# Patient Record
Sex: MALE | Race: WHITE | Employment: OTHER | URBAN - METROPOLITAN AREA
[De-identification: names, ages, dates, MRNs, and addresses within clinical notes are randomized per-mention and may not be internally consistent; named-entity substitution may affect disease eponyms.]

---

## 2017-01-01 ENCOUNTER — OFFICE VISIT (OUTPATIENT)
Dept: INTERNAL MEDICINE CLINIC | Age: 82
End: 2017-01-01

## 2017-01-01 ENCOUNTER — TELEPHONE (OUTPATIENT)
Dept: INTERNAL MEDICINE CLINIC | Age: 82
End: 2017-01-01

## 2017-01-01 VITALS
HEART RATE: 75 BPM | TEMPERATURE: 98.1 F | OXYGEN SATURATION: 97 % | DIASTOLIC BLOOD PRESSURE: 66 MMHG | BODY MASS INDEX: 24.17 KG/M2 | WEIGHT: 163.2 LBS | HEIGHT: 69 IN | SYSTOLIC BLOOD PRESSURE: 112 MMHG | RESPIRATION RATE: 20 BRPM

## 2017-01-01 VITALS
BODY MASS INDEX: 25 KG/M2 | TEMPERATURE: 98 F | SYSTOLIC BLOOD PRESSURE: 112 MMHG | DIASTOLIC BLOOD PRESSURE: 66 MMHG | RESPIRATION RATE: 20 BRPM | HEART RATE: 75 BPM | OXYGEN SATURATION: 98 % | HEIGHT: 69 IN | WEIGHT: 168.8 LBS

## 2017-01-01 VITALS
OXYGEN SATURATION: 93 % | HEART RATE: 74 BPM | RESPIRATION RATE: 20 BRPM | SYSTOLIC BLOOD PRESSURE: 144 MMHG | DIASTOLIC BLOOD PRESSURE: 79 MMHG | WEIGHT: 166 LBS | TEMPERATURE: 97.7 F | HEIGHT: 69 IN | BODY MASS INDEX: 24.59 KG/M2

## 2017-01-01 DIAGNOSIS — E11.9 TYPE 2 DIABETES MELLITUS WITHOUT COMPLICATION, WITH LONG-TERM CURRENT USE OF INSULIN (HCC): ICD-10-CM

## 2017-01-01 DIAGNOSIS — E03.9 ACQUIRED HYPOTHYROIDISM: ICD-10-CM

## 2017-01-01 DIAGNOSIS — K21.9 GASTROESOPHAGEAL REFLUX DISEASE WITHOUT ESOPHAGITIS: ICD-10-CM

## 2017-01-01 DIAGNOSIS — Z79.4 TYPE 2 DIABETES MELLITUS WITHOUT COMPLICATION, WITH LONG-TERM CURRENT USE OF INSULIN (HCC): Primary | ICD-10-CM

## 2017-01-01 DIAGNOSIS — L40.9 PSORIASIS: ICD-10-CM

## 2017-01-01 DIAGNOSIS — Z79.4 TYPE 2 DIABETES MELLITUS WITHOUT COMPLICATION, WITH LONG-TERM CURRENT USE OF INSULIN (HCC): ICD-10-CM

## 2017-01-01 DIAGNOSIS — E55.9 VITAMIN D DEFICIENCY: ICD-10-CM

## 2017-01-01 DIAGNOSIS — J45.909 MILD ASTHMA WITHOUT COMPLICATION, UNSPECIFIED WHETHER PERSISTENT: ICD-10-CM

## 2017-01-01 DIAGNOSIS — E78.00 PURE HYPERCHOLESTEROLEMIA: ICD-10-CM

## 2017-01-01 DIAGNOSIS — Z00.00 MEDICARE ANNUAL WELLNESS VISIT, INITIAL: ICD-10-CM

## 2017-01-01 DIAGNOSIS — I25.10 CORONARY ARTERY DISEASE INVOLVING NATIVE CORONARY ARTERY OF NATIVE HEART WITHOUT ANGINA PECTORIS: ICD-10-CM

## 2017-01-01 DIAGNOSIS — K21.9 GASTROESOPHAGEAL REFLUX DISEASE WITHOUT ESOPHAGITIS: Primary | ICD-10-CM

## 2017-01-01 DIAGNOSIS — E11.9 TYPE 2 DIABETES MELLITUS WITHOUT COMPLICATION, WITH LONG-TERM CURRENT USE OF INSULIN (HCC): Primary | ICD-10-CM

## 2017-01-01 DIAGNOSIS — E03.9 ACQUIRED HYPOTHYROIDISM: Primary | ICD-10-CM

## 2017-01-01 LAB
25(OH)D3+25(OH)D2 SERPL-MCNC: 30.2 NG/ML (ref 30–100)
25(OH)D3+25(OH)D2 SERPL-MCNC: 50.6 NG/ML (ref 30–100)
ALBUMIN SERPL-MCNC: 3.6 G/DL (ref 3.5–4.7)
ALBUMIN SERPL-MCNC: 3.9 G/DL (ref 3.5–4.7)
ALBUMIN/CREAT UR: 38.9 MG/G CREAT (ref 0–30)
ALBUMIN/GLOB SERPL: 1.2 {RATIO} (ref 1.2–2.2)
ALBUMIN/GLOB SERPL: 1.2 {RATIO} (ref 1.2–2.2)
ALP SERPL-CCNC: 131 IU/L (ref 39–117)
ALP SERPL-CCNC: 140 IU/L (ref 39–117)
ALT SERPL-CCNC: 12 IU/L (ref 0–44)
ALT SERPL-CCNC: 9 IU/L (ref 0–44)
AST SERPL-CCNC: 10 IU/L (ref 0–40)
AST SERPL-CCNC: 11 IU/L (ref 0–40)
BILIRUB SERPL-MCNC: 0.3 MG/DL (ref 0–1.2)
BILIRUB SERPL-MCNC: <0.2 MG/DL (ref 0–1.2)
BUN SERPL-MCNC: 10 MG/DL (ref 8–27)
BUN SERPL-MCNC: 14 MG/DL (ref 8–27)
BUN/CREAT SERPL: 12 (ref 10–24)
BUN/CREAT SERPL: 14 (ref 10–24)
CALCIUM SERPL-MCNC: 9.2 MG/DL (ref 8.6–10.2)
CALCIUM SERPL-MCNC: 9.5 MG/DL (ref 8.6–10.2)
CHLORIDE SERPL-SCNC: 96 MMOL/L (ref 96–106)
CHLORIDE SERPL-SCNC: 97 MMOL/L (ref 96–106)
CO2 SERPL-SCNC: 24 MMOL/L (ref 18–29)
CO2 SERPL-SCNC: 28 MMOL/L (ref 18–29)
CREAT SERPL-MCNC: 0.86 MG/DL (ref 0.76–1.27)
CREAT SERPL-MCNC: 0.98 MG/DL (ref 0.76–1.27)
CREAT UR-MCNC: 157.1 MG/DL
ERYTHROCYTE [DISTWIDTH] IN BLOOD BY AUTOMATED COUNT: 13.1 % (ref 12.3–15.4)
EST. AVERAGE GLUCOSE BLD GHB EST-MCNC: 292 MG/DL
EST. AVERAGE GLUCOSE BLD GHB EST-MCNC: 335 MG/DL
GFR SERPLBLD CREATININE-BSD FMLA CKD-EPI: 80 ML/MIN/1.73
GFR SERPLBLD CREATININE-BSD FMLA CKD-EPI: 93 ML/MIN/1.73
GLOBULIN SER CALC-MCNC: 3 G/DL (ref 1.5–4.5)
GLOBULIN SER CALC-MCNC: 3.2 G/DL (ref 1.5–4.5)
GLUCOSE SERPL-MCNC: 284 MG/DL (ref 65–99)
GLUCOSE SERPL-MCNC: 294 MG/DL (ref 65–99)
HBA1C MFR BLD: 11.8 % (ref 4.8–5.6)
HBA1C MFR BLD: 13.3 % (ref 4.8–5.6)
HCT VFR BLD AUTO: 45.1 % (ref 37.5–51)
HGB BLD-MCNC: 15.7 G/DL (ref 12.6–17.7)
Lab: NORMAL
MCH RBC QN AUTO: 30.4 PG (ref 26.6–33)
MCHC RBC AUTO-ENTMCNC: 34.8 G/DL (ref 31.5–35.7)
MCV RBC AUTO: 87 FL (ref 79–97)
MICROALBUMIN UR-MCNC: 61.1 UG/ML
PLATELET # BLD AUTO: 300 X10E3/UL (ref 150–379)
POTASSIUM SERPL-SCNC: 5 MMOL/L (ref 3.5–5.2)
POTASSIUM SERPL-SCNC: 5.2 MMOL/L (ref 3.5–5.2)
PROT SERPL-MCNC: 6.6 G/DL (ref 6–8.5)
PROT SERPL-MCNC: 7.1 G/DL (ref 6–8.5)
RBC # BLD AUTO: 5.17 X10E6/UL (ref 4.14–5.8)
SODIUM SERPL-SCNC: 137 MMOL/L (ref 134–144)
SODIUM SERPL-SCNC: 139 MMOL/L (ref 134–144)
TSH SERPL DL<=0.005 MIU/L-ACNC: 0.05 UIU/ML (ref 0.45–4.5)
TSH SERPL DL<=0.005 MIU/L-ACNC: 4.88 UIU/ML (ref 0.45–4.5)
WBC # BLD AUTO: 8.7 X10E3/UL (ref 3.4–10.8)

## 2017-01-01 RX ORDER — LANCETS
EACH MISCELLANEOUS
Qty: 100 EACH | Refills: 12 | Status: SHIPPED | OUTPATIENT
Start: 2017-01-01

## 2017-01-01 RX ORDER — OMEPRAZOLE 20 MG/1
CAPSULE, DELAYED RELEASE ORAL
Qty: 60 CAP | Refills: 5 | Status: SHIPPED | OUTPATIENT
Start: 2017-01-01

## 2017-01-01 RX ORDER — LEVOTHYROXINE SODIUM 150 UG/1
150 TABLET ORAL
Qty: 30 TAB | Refills: 3 | Status: SHIPPED | OUTPATIENT
Start: 2017-01-01

## 2017-01-01 RX ORDER — INSULIN PUMP SYRINGE, 3 ML
EACH MISCELLANEOUS
Qty: 1 KIT | Refills: 0 | Status: SHIPPED | OUTPATIENT
Start: 2017-01-01

## 2017-01-01 RX ORDER — LEVOTHYROXINE SODIUM 175 UG/1
175 TABLET ORAL
Qty: 30 TAB | Refills: 5 | Status: SHIPPED | OUTPATIENT
Start: 2017-01-01 | End: 2017-01-01 | Stop reason: DRUGHIGH

## 2017-01-01 RX ORDER — METFORMIN HYDROCHLORIDE 750 MG/1
TABLET, EXTENDED RELEASE ORAL
Qty: 90 TAB | Refills: 5 | Status: SHIPPED | OUTPATIENT
Start: 2017-01-01

## 2017-01-01 RX ORDER — METFORMIN HYDROCHLORIDE 750 MG/1
1500 TABLET, EXTENDED RELEASE ORAL DAILY
Qty: 60 TAB | Refills: 5 | Status: SHIPPED | OUTPATIENT
Start: 2017-01-01 | End: 2017-01-01 | Stop reason: SDUPTHER

## 2017-01-01 RX ORDER — OMEPRAZOLE 20 MG/1
40 CAPSULE, DELAYED RELEASE ORAL DAILY
Qty: 60 CAP | Refills: 5 | Status: SHIPPED | OUTPATIENT
Start: 2017-01-01 | End: 2017-01-01 | Stop reason: SDUPTHER

## 2017-01-01 RX ORDER — ATORVASTATIN CALCIUM 20 MG/1
TABLET, FILM COATED ORAL
Refills: 4 | COMMUNITY
Start: 2017-01-01

## 2017-01-01 RX ORDER — LEVOTHYROXINE SODIUM 150 UG/1
150 TABLET ORAL
Qty: 30 TAB | Refills: 3 | Status: SHIPPED | OUTPATIENT
Start: 2017-01-01 | End: 2017-01-01 | Stop reason: DRUGHIGH

## 2017-01-01 RX ORDER — ALBUTEROL SULFATE 90 UG/1
1 AEROSOL, METERED RESPIRATORY (INHALATION)
Qty: 1 INHALER | Refills: 1 | Status: SHIPPED | OUTPATIENT
Start: 2017-01-01

## 2017-02-28 NOTE — PROGRESS NOTES
Health Maintenance Due   Topic Date Due    FOOT EXAM Q1  09/09/1944    EYE EXAM RETINAL OR DILATED Q1  09/09/1944    DTaP/Tdap/Td series (1 - Tdap) 09/09/1955    ZOSTER VACCINE AGE 60>  09/09/1994    GLAUCOMA SCREENING Q2Y  09/09/1999    Pneumococcal 65+ Low/Medium Risk (1 of 2 - PCV13) 09/09/1999    MEDICARE YEARLY EXAM  09/09/1999    INFLUENZA AGE 9 TO ADULT  08/01/2016       Chief Complaint   Patient presents with    Follow-up     4 month    Coronary Artery Disease    Cholesterol Problem    Diabetes    Hypothyroidism       1. Have you been to the ER, urgent care clinic since your last visit? Hospitalized since your last visit? No    2. Have you seen or consulted any other health care providers outside of the [address] [place] [address] since your last visit? Include any pap smears or colon screening. No    3) Do you have an Advance Directive on file? no    4) Are you interested in receiving information on Advance Directives? NO      Patient is accompanied by self I have received verbal consent from Yuliya Breaux to discuss any/all medical information while they are present in the room.

## 2017-02-28 NOTE — PATIENT INSTRUCTIONS

## 2017-02-28 NOTE — MR AVS SNAPSHOT
Visit Information Date & Time Provider Department Dept. Phone Encounter #  
 2/28/2017  8:30 AM Nori Kim MD [facility] Internal Medicine [facility] [phone] [id number] Upcoming Health Maintenance Date Due  
 FOOT EXAM Q1 9/9/1944 EYE EXAM RETINAL OR DILATED Q1 9/9/1944 DTaP/Tdap/Td series (1 - Tdap) 9/9/1955 ZOSTER VACCINE AGE 60> 9/9/1994 GLAUCOMA SCREENING Q2Y 9/9/1999 Pneumococcal 65+ Low/Medium Risk (1 of 2 - PCV13) 9/9/1999 MEDICARE YEARLY EXAM 9/9/1999 INFLUENZA AGE 9 TO ADULT 8/1/2016 HEMOGLOBIN A1C Q6M 4/25/2017 MICROALBUMIN Q1 7/27/2017 LIPID PANEL Q1 7/27/2017 Allergies as of 2/28/2017  Review Complete On: 2/28/2017 By: Nori Kim MD  
  
 Severity Noted Reaction Type Reactions Shellfish Derived High 07/26/2016    Anaphylaxis Iodine Medium 07/26/2016    Rash Vancomycin  07/26/2016    Other (comments) States had extreme temp change Current Immunizations  Never Reviewed No immunizations on file. Not reviewed this visit You Were Diagnosed With   
  
 Codes Comments Type 2 diabetes mellitus without complication, with long-term current use of insulin (HCC)    -  Primary ICD-10-CM: E11.9, Z79.4 ICD-9-CM: 250.00, V58.67 Pure hypercholesterolemia     ICD-10-CM: E78.00 ICD-9-CM: 272.0 Acquired hypothyroidism     ICD-10-CM: E03.9 ICD-9-CM: 244.9 Psoriasis     ICD-10-CM: L40.9 ICD-9-CM: 696.1 Vitamin D deficiency     ICD-10-CM: E55.9 ICD-9-CM: 268.9 Vitals BP  
  
  
  
  
  
 112/66 (BP 1 Location: Right arm, BP Patient Position: Sitting) Vitals History BMI and BSA Data Body Mass Index Body Surface Area  
 24.1 kg/m 2 1.9 m 2 Preferred Pharmacy Pharmacy Name [facility] [phone] Your Updated Medication List  
  
   
This list is accurate as of: 2/28/17  9:12 AM.  Always use your most recent med list.  
  
  
  
  
 adalimumab 40 mg/0.8 mL Pnkt  
by SubCUTAneous route. Indications: inject 1 every 15 days  
  
 aspirin delayed-release 81 mg tablet Take  by mouth daily. atorvastatin 20 mg tablet Commonly known as:  LIPITOR Blood-Glucose Meter monitoring kit True metrix meter Provider, please review the patient's drug allergy history. Some issues need clarification. BS TID Cholecalciferol (Vitamin D3) 2,000 unit Cap capsule Commonly known as:  VITAMIN D3 Take 2,000 Units by mouth daily. dorzolamide 2 % ophthalmic solution Commonly known as:  TRUSOPT Administer 1 Drop to both eyes two (2) times a day. glucose blood VI test strips strip Commonly known as:  ASCENSIA AUTODISC VI, ONE TOUCH ULTRA TEST VI  
True metrix strips. check BS TID  
  
 insulin glargine 300 unit/mL (1.5 mL) Inpn 30 Units by SubCUTAneous route every morning. Lancets Misc Check BS TID  
  
 latanoprost 0.005 % ophthalmic solution Commonly known as:  Joyceann Shock Administer 1 Drop to both eyes nightly. levothyroxine 125 mcg tablet Commonly known as:  SYNTHROID Take 1 Tab by mouth Daily (before breakfast). metFORMIN  mg tablet Commonly known as:  GLUCOPHAGE XR Take 1 Tab by mouth daily. ONE TOUCH TEST STRIP YOUSSEF  
by Does Not Apply route. Prescriptions Sent to Pharmacy Refills Blood-Glucose Meter monitoring kit 0 Sig: True metrix meter Provider, please review the patient's drug allergy history. Some issues need clarification. BS TID Class: Normal  
 Pharmacy: [address] Ph #: [phone]  
 glucose blood VI test strips (ASCENSIA AUTODISC VI, ONE TOUCH ULTRA TEST VI) strip 12 Sig: True metrix strips. check BS TID Class: Normal  
 Pharmacy: [address] Ph #: [phone] Lancets misc 12 Sig: Check BS TID Class: Normal  
 Pharmacy: [address]  Ph #: [phone] We Performed the Following CBC WITH AUTOMATED DIFF [14983 CPT(R)] HEMOGLOBIN A1C WITH EAG [37042 CPT(R)] METABOLIC PANEL, COMPREHENSIVE [50177 CPT(R)] TSH 3RD GENERATION [40330 CPT(R)] VITAMIN D, 25 HYDROXY [id number] CPT(R)] Patient Instructions Learning About Diabetes Food Guidelines Your Care Instructions Meal planning is important to manage diabetes. It helps keep your blood sugar at a target level (which you set with your doctor). You don't have to eat special foods. You can eat what your family eats, including sweets once in a while. But you do have to pay attention to how often you eat and how much you eat of certain foods. You may want to work with a dietitian or a certified diabetes educator (CDE) to help you plan meals and snacks. A dietitian or CDE can also help you lose weight if that is one of your goals. What should you know about eating carbs? Managing the amount of carbohydrate (carbs) you eat is an important part of healthy meals when you have diabetes. Carbohydrate is found in many foods. · Learn which foods have carbs. And learn the amounts of carbs in different foods. ¨ Bread, cereal, pasta, and rice have about 15 grams of carbs in a serving. A serving is 1 slice of bread (1 ounce), ½ cup of cooked cereal, or 1/3 cup of cooked pasta or rice. ¨ Fruits have 15 grams of carbs in a serving. A serving is 1 small fresh fruit, such as an apple or orange; ½ of a banana; ½ cup of cooked or canned fruit; ½ cup of fruit juice; 1 cup of melon or raspberries; or 2 tablespoons of dried fruit. ¨ Milk and no-sugar-added yogurt have 15 grams of carbs in a serving. A serving is 1 cup of milk or 2/3 cup of no-sugar-added yogurt. ¨ Starchy vegetables have 15 grams of carbs in a serving.  A serving is ½ cup of mashed potatoes or sweet potato; 1 cup winter squash; ½ of a small baked potato; ½ cup of cooked beans; or ½ cup cooked corn or green peas. · Learn how much carbs to eat each day and at each meal. A dietitian or CDE can teach you how to keep track of the amount of carbs you eat. This is called carbohydrate counting. · If you are not sure how to count carbohydrate grams, use the Plate Method to plan meals. It is a good, quick way to make sure that you have a balanced meal. It also helps you spread carbs throughout the day. ¨ Divide your plate by types of foods. Put non-starchy vegetables on half the plate, meat or other protein food on one-quarter of the plate, and a grain or starchy vegetable in the final quarter of the plate. To this you can add a small piece of fruit and 1 cup of milk or yogurt, depending on how many carbs you are supposed to eat at a meal. 
· Try to eat about the same amount of carbs at each meal. Do not \"save up\" your daily allowance of carbs to eat at one meal. 
· Proteins have very little or no carbs per serving. Examples of proteins are beef, chicken, turkey, fish, eggs, tofu, cheese, cottage cheese, and peanut butter. A serving size of meat is 3 ounces, which is about the size of a deck of cards. Examples of meat substitute serving sizes (equal to 1 ounce of meat) are 1/4 cup of cottage cheese, 1 egg, 1 tablespoon of peanut butter, and ½ cup of tofu. How can you eat out and still eat healthy? · Learn to estimate the serving sizes of foods that have carbohydrate. If you measure food at home, it will be easier to estimate the amount in a serving of restaurant food. · If the meal you order has too much carbohydrate (such as potatoes, corn, or baked beans), ask to have a low-carbohydrate food instead. Ask for a salad or green vegetables. · If you use insulin, check your blood sugar before and after eating out to help you plan how much to eat in the future. · If you eat more carbohydrate at a meal than you had planned, take a walk or do other exercise. This will help lower your blood sugar. What else should you know? · Limit saturated fat, such as the fat from meat and dairy products. This is a healthy choice because people who have diabetes are at higher risk of heart disease. So choose lean cuts of meat and nonfat or low-fat dairy products. Use olive or canola oil instead of butter or shortening when cooking. · Don't skip meals. Your blood sugar may drop too low if you skip meals and take insulin or certain medicines for diabetes. · Check with your doctor before you drink alcohol. Alcohol can cause your blood sugar to drop too low. Alcohol can also cause a bad reaction if you take certain diabetes medicines. Follow-up care is a key part of your treatment and safety. Be sure to make and go to all appointments, and call your doctor if you are having problems. It's also a good idea to know your test results and keep a list of the medicines you take. Where can you learn more? Go to http://jazz-roger.info/. Enter E051 in the search box to learn more about \"Learning About Diabetes Food Guidelines. \" Current as of: May 23, 2016 Content Version: 11.1 © 4287-2503 [employer]. Care instructions adapted under license by [employer] (which disclaims liability or warranty for this information). If you have questions about a medical condition or this instruction, always ask your healthcare professional. [place] [address] any warranty or liability for your use of this information. Introducing [facility]! Evelyn Major introduces [employer] patient portal. Now you can access parts of your medical record, email your doctor's office, and request medication refills online. 1. In your internet browser, go to [email or web address] 2. Click on the First Time User? Click Here link in the Sign In box. You will see the New Member Sign Up page. 3. Enter your Ornis Access Code exactly as it appears below. You will not need to use this code after youve completed the sign-up process. If you do not sign up before the expiration date, you must request a new code. · Ornis Access Code: [email or web address] Expires: 5/29/2017  9:12 AM 
 
4. Enter the last four digits of your Social Security Number (xxxx) and Date of Birth (mm/dd/yyyy) as indicated and click Submit. You will be taken to the next sign-up page. 5. Create a Ornis ID. This will be your Ornis login ID and cannot be changed, so think of one that is secure and easy to remember. 6. Create a Ornis password. You can change your password at any time. 7. Enter your Password Reset Question and Answer. This can be used at a later time if you forget your password. 8. Enter your e-mail address. You will receive e-mail notification when new information is available in [address] 9. Click Sign Up. You can now view and download portions of your medical record. 10. Click the Download Summary menu link to download a portable copy of your medical information. If you have questions, please visit the Frequently Asked Questions section of the Ornis website. Remember, Ornis is NOT to be used for urgent needs. For medical emergencies, dial 911. Now available from your iPhone and Android! Please provide this summary of care documentation to your next provider. Your primary care clinician is listed as Mary Jo Hdz. If you have any questions after today's visit, please call [phone].

## 2017-02-28 NOTE — PROGRESS NOTES
HISTORY OF PRESENT ILLNESS  Trent Mackay is a 80 y.o. male here for follow up.he mention [facility] came to his room and had lab work this month. he brought lab result. He is seen for diabetes. He reports medication compliance: compliant all of the time. Diabetic diet compliance: noncompliant much of the time. Home glucose monitoring: is performed sporadically, Patient did not bring glucose log to this visit. . Further diabetic ROS: no polyuria or polydipsia, no chest pain, dyspnea or TIA's, no numbness, tingling or pain in extremities, no hypoglycemia. Lab review: labs reviewed, I note that glycosylated hemoglobin normal, abnormal 12., labs reviewed and discussed with patient. Eye exam: up to date     Ha slow thyroid. on med. labs are done ,thyroid is low. He has dry flaky skin. has psoriasis. he mention he did nto have humara shot for past 2 months. He will restart again. HPI    Review of Systems   Constitutional: Negative. HENT: Negative. Eyes: Negative. Cardiovascular: Negative. Gastrointestinal: Negative. Genitourinary: Negative. Musculoskeletal: Negative. Skin: Positive for rash. Neurological: Negative. Psychiatric/Behavioral: Negative. Physical Exam   Constitutional: He appears well-developed and well-nourished. No distress. Neck: Normal range of motion. Neck supple. No JVD present. No thyromegaly present. Cardiovascular: Normal rate, regular rhythm, normal heart sounds and intact distal pulses. Pulmonary/Chest: Effort normal and breath sounds normal. No respiratory distress. He has no wheezes. Skin:   Dry flaky skin   Psychiatric: He has a normal mood and affect. His behavior is normal.       ASSESSMENT and PLAN  Timi Resendiz was seen today for follow-up, coronary artery disease, cholesterol problem, diabetes and hypothyroidism.     Diagnoses and all orders for this visit:    Type 2 diabetes mellitus without complication, with long-term current use of insulin ([facility]    Will order,  -     Blood-Glucose Meter monitoring kit; True metrix meter Provider, please review the patient's drug allergy history. Some issues need clarification. BS TID  -     glucose blood VI test strips (ASCENSIA AUTODISC VI, ONE TOUCH ULTRA TEST VI) strip; True metrix strips. check BS TID  -     Lancets misc; Check BS TID  Pt already had recent lab work done by [facility]. A1c is 12,slightly improved. -     Cancel: HEMOGLOBIN A1C WITH EAG  -     Cancel: METABOLIC PANEL, COMPREHENSIVE  -     METABOLIC PANEL, BASIC    Will increase,  -     insulin glargine 300 unit/mL (1.5 mL) inpn; 34 Units by SubCUTAneous route every morning. will increase metformin XR to 1500 mg every day. will check CMP in 1month. Pure hypercholesterolemia    done labs. stable. -METABOLIC PANEL, COMPREHENSIVE    Acquired hypothyroidism    TSh is high. Will increase, syntroid. -     TSH 3RD GENERATION  -     levothyroxine (SYNTHROID) 150 mcg tablet; Take 1 Tab by mouth Daily (before breakfast). -     TSH 3RD GENERATION; Future in 6 weeks. Psoriasis    On humara injection.  -     Cancel: CBC WITH AUTOMATED DIFF    Vitamin D deficiency   VITAMIN D, 25 HYDROXY    Uncontrolled type 2 diabetes mellitus without complication, with long-term current use of insulin (HCC)  -     metFORMIN ER (GLUCOPHAGE XR) 750 mg tablet; Take 2 Tabs by mouth daily. -     VITAMIN D, 25 HYDROXY  -     insulin glargine 300 unit/mL (1.5 mL) inpn; 34 Units by SubCUTAneous route every morning. Issues reviewed with her: referral to Diabetic Education department, diabetic diet discussed in detail, written exchange diet given, home glucose monitoring emphasized, all medications, side effects and compliance discussed carefully, foot care discussed and Podiatry visits discussed, annual eye examinations at Ophthalmology discussed, long term diabetic complications discussed and labs immediately prior to next visit.

## 2017-06-27 NOTE — PATIENT INSTRUCTIONS
Schedule of Personalized Health Plan  (Provide Copy to Patient)  The best way to stay healthy is to live a healthy lifestyle. A healthy lifestyle includes regular exercise, eating a well-balanced diet, keeping a healthy weight and not smoking. Regular physical exams and screening tests are another important way to take care of yourself. Preventive exams provided by health care providers can find health problems early when treatment works best and can keep you from getting certain diseases or illnesses. Preventive services include exams, lab tests, screenings, shots, monitoring and information to help you take care of your own health. All people over 65 should have a pneumonia shot. Pneumonia shots are usually only needed once in a lifetime unless your doctor decides differently. Up to date    All people over 72 should have a yearly flu shot. People over 65 are at medium to high risk for Hepatitis B. Three shots are needed for complete protection. In addition to your physical exam, some screening tests are recommended:    Bone mass measurement (dexa scan) is recommended every two years if you have certain risk factors, such as personal history of vertebral fracture or chronic steroid medication use    Diabetes Mellitus screening is recommended every year. Glaucoma is an eye disease caused by high pressure in the eye. An eye exam is recommended every year. Up to date    Cardiovascular screening tests that check your cholesterol and other blood fat (lipid) levels are recommended every five years. Up to date    Colorectal Cancer screening tests help to find pre-cancerous polyps (growths in the colon) so they can be removed before they turn into cancer. Tests ordered for screening depend on your personal and family history risk factors. N/A    Screening for Prostate Cancer is recommended yearly with a digital rectal exam and/or a PSA test.N/A    Here is a list of your current Health Maintenance items with a due date:  Health Maintenance   Topic Date Due    EYE EXAM RETINAL OR DILATED Q1  09[birth year]09/1944    DTaP/Tdap/Td series (1 - Tdap) 09/09/1955    ZOSTER VACCINE AGE 60>  09/09/1994    Pneumococcal 65+ Low/Medium Risk (1 of 2 - PCV13) 09/09/1999    HEMOGLOBIN A1C Q6M  04/25/2017    MICROALBUMIN Q1  07/27/2017    INFLUENZA AGE 9 TO ADULT  08/01/2017    LIPID PANEL Q1  02/18/2018    FOOT EXAM Q1  06/27/2018    MEDICARE YEARLY EXAM  06/28/2018    GLAUCOMA SCREENING Q2Y  06/02/2019

## 2017-06-27 NOTE — PROGRESS NOTES
HISTORY OF PRESENT ILLNESS  Spike Edwards is a 80 y.o. male here for follow up. Has CAD,seeing cardiologist.will have cath again. He is seen for diabetes. He reports medication compliance: compliant all of the time. Diabetic diet compliance: noncompliant much of the time. Home glucose monitoring: is performed sporadically, Patient did not bring glucose log to this visit. . Further diabetic ROS: no polyuria or polydipsia, no chest pain, dyspnea or TIA's, no numbness, tingling or pain in extremities, no hypoglycemia. Lab review: labs reviewed, I note that glycosylated hemoglobin normal, abnormal 12., labs reviewed and discussed with patient. Eye exam: up to date     Ha slow thyroid. on med. labs are done ,thyroid is low. He has dry flaky skin. has psoriasis. he mention he did not have humara shot for past 5 months. Here for medicare wellness visit. Cholesterol Problem     Diabetes         Review of Systems   Constitutional: Negative. HENT: Negative. Eyes: Negative. Cardiovascular: Negative. Gastrointestinal: Negative. Genitourinary: Negative. Musculoskeletal: Negative. Skin: Negative. Neurological: Negative. Psychiatric/Behavioral: Negative. Physical Exam   Constitutional: He appears well-developed and well-nourished. No distress. Neck: Normal range of motion. Neck supple. No JVD present. No thyromegaly present. Cardiovascular: Normal rate, regular rhythm, normal heart sounds and intact distal pulses. Pulmonary/Chest: Effort normal and breath sounds normal. No respiratory distress. He has no wheezes. Skin:   Dry flaky skin   Psychiatric: He has a normal mood and affect. His behavior is normal.       ASSESSMENT and PLAN  Jono Monge was seen today for coronary artery disease, hypothyroidism, cholesterol problem, diabetes and annual wellness visit.     Diagnoses and all orders for this visit:    Type 2 diabetes mellitus without complication, with long-term current use of insulin ([address]  On metformin. Will do,    -     METABOLIC PANEL, COMPREHENSIVE  -     HEMOGLOBIN A1C WITH EAG  -     MICROALBUMIN, UR, RAND W/ MICROALBUMIN/CREA RATIO    Coronary artery disease involving native coronary artery of native heart without angina pectoris    Stable. will see cardiologist soon for recath.stent place 8 yrs back. -     METABOLIC PANEL, COMPREHENSIVE  -     TSH 3RD GENERATION  -     CBC W/O DIFF    Pure hypercholesterolemia    On statin. -     METABOLIC PANEL, COMPREHENSIVE    Acquired hypothyroidism    On synthroid. -     TSH 3RD GENERATION    Psoriasis  Off injection  Medicare annual wellness visit, initial    Vitamin D deficiency  -     VITAMIN D, 25 HYDROXY    :      Issues reviewed with her: referral to Diabetic Education department, diabetic diet discussed in detail, written exchange diet given, home glucose monitoring emphasized, all medications, side effects and compliance discussed carefully, foot care discussed and Podiatry visits discussed, annual eye examinations at Ophthalmology discussed, long term diabetic complications discussed and labs immediately prior to next visit.

## 2017-06-27 NOTE — PROGRESS NOTES
Health Maintenance Due   Topic Date Due    FOOT EXAM Q1  09[birth year]09/1944    EYE EXAM RETINAL OR DILATED Q1  09/09/1944    DTaP/Tdap/Td series (1 - Tdap) 09/09/1955    ZOSTER VACCINE AGE 60>  09/09/1994    GLAUCOMA SCREENING Q2Y  09/09/1999    Pneumococcal 65+ Low/Medium Risk (1 of 2 - PCV13) 09/09/1999    MEDICARE YEARLY EXAM  09/09/1999    HEMOGLOBIN A1C Q6M  04/25/2017    MICROALBUMIN Q1  07/27/2017       Chief Complaint   Patient presents with    Coronary Artery Disease     4 month follow up    Hypothyroidism    Cholesterol Problem    Diabetes       1. Have you been to the ER, urgent care clinic since your last visit? Hospitalized since your last visit? No    2. Have you seen or consulted any other health care providers outside of the [address] [place] [address] since your last visit? Include any pap smears or colon screening. No    3) Do you have an Advance Directive on file? no    4) Are you interested in receiving information on Advance Directives? NO      Patient is accompanied by self I have received verbal consent from Maribel Lopez to discuss any/all medical information while they are present in the room.

## 2017-06-27 NOTE — PROGRESS NOTES
Rogenia Fabry is a 80 y.o. male and presents for annual Medicare Wellness Visit. Problem List: Reviewed with patient and discussed risk factors. Patient Active Problem List   Diagnosis Code    Coronary artery disease involving native coronary artery of native heart without angina pectoris I25.10    Pure hypercholesterolemia E78.00    Type 2 diabetes mellitus without complication (HCC) G31.2    Acquired hypothyroidism E03.9    Psoriasis L40.9    Schatzki's ring K22.2    Advance care planning Z71.89    Uncontrolled type 2 diabetes mellitus without complication, with long-term current use of insulin ([facility] E11.65, Z79.4       Current medical providers:  Patient Care Team:  Roberts Spatz, MD as PCP - General (Internal Medicine)  Janet Duke LPN    PSH: Reviewed with patient  Past Surgical History:   Procedure Laterality Date    CARDIAC SURG PROCEDURE UNLIST      coronary angioplasty,and stent placement    HX CIRCUMCISION      HX HEENT      nasal polyp removal    NEUROLOGICAL PROCEDURE UNLISTED      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 60 Cap 5    atorvastatin (LIPITOR) 20 mg tablet   4    Blood-Glucose Meter monitoring kit True metrix meter Provider, please review the patient's drug allergy history. Some issues need clarification.  BS TID 1 Kit 0    glucose blood VI test strips (ASCENSIA AUTODISC VI, ONE TOUCH ULTRA TEST VI) strip True metrix strips. check BS  Strip 12    Lancets misc Check BS  Each 12    metFORMIN ER (GLUCOPHAGE XR) 750 mg tablet Take 2 Tabs by mouth daily. 60 Tab 5    levothyroxine (SYNTHROID) 150 mcg tablet Take 1 Tab by mouth Daily (before breakfast). 30 Tab 3    insulin glargine 300 unit/mL (1.5 mL) inpn 34 Units by SubCUTAneous route every morning. 2 Adjustable Dose Pre-filled Pen Syringe 5    Cholecalciferol, Vitamin D3, (VITAMIN D3) 2,000 unit cap capsule Take 2,000 Units by mouth daily. 30 Cap 4    dorzolamide (TRUSOPT) 2 % ophthalmic solution Administer 1 Drop to both eyes two (2) times a day.  latanoprost (XALATAN) 0.005 % ophthalmic solution Administer 1 Drop to both eyes nightly.  DIABETIC SUPPLIES,MISCELL (ONE TOUCH TEST STRIP YOUSSEF) by Does Not Apply route.  aspirin delayed-release 81 mg tablet Take  by mouth daily.  adalimumab 40 mg/0.8 mL pnkt by SubCUTAneous route. Indications: inject 1 every 15 days       No current facility-administered medications on file prior to visit. Allergies   Allergen Reactions    Shellfish Derived Anaphylaxis    Iodine Rash    Vancomycin Other (comments)     States had extreme temp change         Objective:  Visit Vitals    /66 (BP 1 Location: Right arm, BP Patient Position: Sitting)    Pulse 75    Temp 98 °F (36.7 °C) (Oral)    Resp 20    Ht 5' 9\" (1.753 m)    Wt 168 lb 12.8 oz (76.6 kg)    SpO2 98%    BMI 24.93 kg/m2    Body mass index is 24.93 kg/(m^2). Assessment of cognitive impairment: Alert and oriented x 3    Depression Screen:   PHQ over the last two weeks 6/27/2017   Little interest or pleasure in doing things Not at all   Feeling down, depressed or hopeless Not at all   Total Score PHQ 2 0       Fall Risk Assessment:    Fall Risk Assessment, last 12 mths 6/27/2017   Able to walk? Yes   Fall in past 12 months? No       Functional Ability:   Does the patient exhibit a steady gait?   yes   How long did it take the patient to get up and walk from a sitting position? 45 sec   Is the patient self reliant?  (ie can do own laundry, meals, household chores)  yes     Does the patient handle his/her own medications? yes     Does the patient handle his/her own money? yes     Is the patients home safe (ie good lighting, handrails on stairs and bath, etc.)? yes     Did you notice or did patient express any hearing difficulties? no     Did you notice or did patient express any vision difficulties?   no     Were distance and reading eye charts used? no       Advance Care Planning:   Patient was offered the opportunity to discuss advance care planning:  no     Does patient have an Advance Directive:  yes   If no, did you provide information on Caring Connections?  no       Plan:      Orders Placed This Encounter    METABOLIC PANEL, COMPREHENSIVE    HEMOGLOBIN A1C WITH EAG    TSH 3RD GENERATION    CBC W/O DIFF    VITAMIN D, 25 HYDROXY    MICROALBUMIN, UR, RAND W/ MICROALBUMIN/CREA RATIO       Health Maintenance   Topic Date Due    EYE EXAM RETINAL OR DILATED Q1  09[birth year]09/1944    DTaP/Tdap/Td series (1 - Tdap) 09/09/1955    ZOSTER VACCINE AGE 60>  09/09/1994    Pneumococcal 65+ Low/Medium Risk (1 of 2 - PCV13) 09/09/1999    HEMOGLOBIN A1C Q6M  04/25/2017    MICROALBUMIN Q1  07/27/2017    INFLUENZA AGE 9 TO ADULT  08/01/2017    LIPID PANEL Q1  02/18/2018    FOOT EXAM Q1  06/27/2018    MEDICARE YEARLY EXAM  06/28/2018    GLAUCOMA SCREENING Q2Y  06/02/2019       *Patient verbalized understanding and agreement with the plan. A copy of the After Visit Summary with personalized health plan was given to the patient today.

## 2017-06-27 NOTE — LETTER
6/29/2017 3:23 PM 
 
Mr. Rosa [id number] [phone] Dear Fransisca Kc: Please find your most recent results below. Resulted Orders METABOLIC PANEL, COMPREHENSIVE Result Value Ref Range Glucose 294 (H) 65 - 99 mg/dL BUN 14 8 - 27 mg/dL Creatinine 0.98 0.76 - 1.27 mg/dL GFR est non-AA 72 >59 mL/min/1.73 GFR est AA 83 >59 mL/min/1.73  
 BUN/Creatinine ratio 14 10 - 24 Sodium 137 134 - 144 mmol/L Potassium 5.0 3.5 - 5.2 mmol/L Chloride 96 96 - 106 mmol/L  
 CO2 24 18 - 29 mmol/L Calcium 9.5 8.6 - 10.2 mg/dL Protein, total 7.1 6.0 - 8.5 g/dL Albumin 3.9 3.5 - 4.7 g/dL GLOBULIN, TOTAL 3.2 1.5 - 4.5 g/dL A-G Ratio 1.2 1.2 - 2.2 Bilirubin, total <0.2 0.0 - 1.2 mg/dL Alk. phosphatase 131 (H) 39 - 117 IU/L  
 AST (SGOT) 10 0 - 40 IU/L  
 ALT (SGPT) 9 0 - 44 IU/L Narrative Performed at:  [address]  [id number] : Sophie Roberts MD, Phone:  [phone] HEMOGLOBIN A1C WITH EAG Result Value Ref Range Hemoglobin A1c 11.8 (H) 4.8 - 5.6 % Comment:  
            Pre-diabetes: 5.7 - 6.4 Diabetes: >6.4 Glycemic control for adults with diabetes: <7.0 Estimated average glucose 292 mg/dL Narrative Performed at:  [address]  [id number] : Sophie Roberts MD, Phone:  [phone] TSH 3RD GENERATION Result Value Ref Range TSH 4.880 (H) 0.450 - 4.500 uIU/mL Narrative Performed at:  [address]  [id number] : Sophie Roberts MD, Phone:  [phone] CBC W/O DIFF Result Value Ref Range WBC 8.7 3.4 - 10.8 x10E3/uL  
 RBC 5.17 4.14 - 5.80 x10E6/uL HGB 15.7 12.6 - 17.7 g/dL HCT 45.1 37.5 - 51.0 % MCV 87 79 - 97 fL  
 MCH 30.4 26.6 - 33.0 pg  
 MCHC 34.8 31.5 - 35.7 g/dL  
 RDW 13.1 12.3 - 15.4 % PLATELET 338 845 - 363 x10E3/uL Narrative Performed at:  [address]  [id number] : Eze Dewitt MD, Phone:  [phone] VITAMIN D, 25 HYDROXY Result Value Ref Range VITAMIN D, 25-HYDROXY 30.2 30.0 - 100.0 ng/mL Comment:  
   Vitamin D deficiency has been defined by the [address] [place] [address] practice guideline as a 
level of serum 25-OH vitamin D less than 20 ng/mL (1,2). The Endocrine Society went on to further define vitamin D 
insufficiency as a level between 21 and 29 ng/mL (2). 1. IOM ([place] [employer]). 2010. Dietary reference 
   intakes for calcium and D. [facility]: The 
   [employer]. 2. Rom MF, Rosangela NC, Yamilex DUMONT, et al. 
   Evaluation, treatment, and prevention of vitamin D 
   deficiency: an Endocrine Society clinical practice 
   guideline. JCEM. 2011 Jul; 96(7):1911-30. Narrative Performed at:  [address]  [id number] : Eze Dewitt MD, Phone:  [phone] MICROALBUMIN, UR, RAND W/ MICROALBUMIN/CREA RATIO Result Value Ref Range Creatinine, urine 157.1 Not Estab. mg/dL Microalbumin, urine 61.1 Not Estab. ug/mL Microalb/Creat ratio (ug/mg creat.) 38.9 (H) 0.0 - 30.0 mg/g creat Narrative Performed at:  [address]  [id number] : Eze Dewitt MD, Phone:  [phone] DIABETES PATIENT EDUCATION Result Value Ref Range PDF Image Not applicable Narrative Performed at:  [address] [place] [address]  [id number] : Cale Tinoco PhD, Phone:  [phone] RECOMMENDATIONS: 
Gill Kc your labs indicate that your diabetes is not controlled, please increase your insulin by 2 units and change your Metformin to 1 tab in am and 2 tabs in pm. We will check more labs in 4-6 weeks. I have enclosed that lab slip for your convenience Rondall Riding labs indicate that your thyroid level is abnormal, we have changed your dose of levothyroxine to 175mcg take 1 tab every morning. We will need to recheck your thyroid level in approximately 6 weeks. I have enclosed that lab slip for your convenience. Please call me if you have any questions: [phone] Sincerely, Darylene Skinner, MD

## 2017-06-27 NOTE — MR AVS SNAPSHOT
Visit Information Date & Time Provider Department Dept. Phone Encounter #  
 6/27/2017  8:30 AM Segundo Berrios MD [facility] Internal Medicine [facility] [phone] [id number] Upcoming Health Maintenance Date Due  
 EYE EXAM RETINAL OR DILATED Q1 9[birth year]9/1944 DTaP/Tdap/Td series (1 - Tdap) 9/9/1955 ZOSTER VACCINE AGE 60> 9/9/1994 Pneumococcal 65+ Low/Medium Risk (1 of 2 - PCV13) 9/9/1999 HEMOGLOBIN A1C Q6M 4/25/2017 MICROALBUMIN Q1 7/27/2017 INFLUENZA AGE 9 TO ADULT 8/1/2017 LIPID PANEL Q1 2/18/2018 FOOT EXAM Q1 6/27/2018 MEDICARE YEARLY EXAM 6/28/2018 GLAUCOMA SCREENING Q2Y 6/2/2019 Allergies as of 6/27/2017  Review Complete On: 6/27/2017 By: Segundo Berrios MD  
  
 Severity Noted Reaction Type Reactions Shellfish Derived High 07/26/2016    Anaphylaxis Iodine Medium 07/26/2016    Rash Vancomycin  07/26/2016    Other (comments) States had extreme temp change Current Immunizations  Reviewed on 6/27/2017 Name Date Pneumococcal Conjugate (PCV-13) 7/27/2016 Pneumococcal Polysaccharide (PPSV-23) 6/27/2015 Zoster Vaccine, Live 5/11/2017 Reviewed by Segundo Berrios MD on 6/27/2017 at  9:30 AM  
You Were Diagnosed With   
  
 Codes Comments Type 2 diabetes mellitus without complication, with long-term current use of insulin (HCC)    -  Primary ICD-10-CM: E11.9, Z79.4 ICD-9-CM: 250.00, V58.67 Coronary artery disease involving native coronary artery of native heart without angina pectoris     ICD-10-CM: I25.10 ICD-9-CM: 414.01   
 Pure hypercholesterolemia     ICD-10-CM: E78.00 ICD-9-CM: 272.0 Acquired hypothyroidism     ICD-10-CM: E03.9 ICD-9-CM: 244.9 Psoriasis     ICD-10-CM: L40.9 ICD-9-CM: 696.1 Medicare annual wellness visit, initial     ICD-10-CM: Z00.00 ICD-9-CM: V70.0 Vitamin D deficiency     ICD-10-CM: E55.9 ICD-9-CM: 268.9 Vitals BP Pulse Temp Resp Height(growth percentile) Weight(growth percentile) 112/66 (BP 1 Location: Right arm, BP Patient Position: Sitting) 75 98 °F (36.7 °C) (Oral) 20 5' 9\" (1.753 m) 168 lb 12.8 oz (76.6 kg) SpO2 BMI Smoking Status 98% 24.93 kg/m2 Former Smoker Vitals History BMI and BSA Data Body Mass Index Body Surface Area 24.93 kg/m 2 1.93 m 2 Preferred Pharmacy Pharmacy Name [facility] [phone] Your Updated Medication List  
  
   
This list is accurate as of: 6/27/17  9:32 AM.  Always use your most recent med list.  
  
  
  
  
 adalimumab 40 mg/0.8 mL injection pen Commonly known as:  HUMIRA  
by SubCUTAneous route. Indications: inject 1 every 15 days  
  
 aspirin delayed-release 81 mg tablet Take  by mouth daily. atorvastatin 20 mg tablet Commonly known as:  LIPITOR Blood-Glucose Meter monitoring kit True metrix meter Provider, please review the patient's drug allergy history. Some issues need clarification. BS TID Cholecalciferol (Vitamin D3) 2,000 unit Cap capsule Commonly known as:  VITAMIN D3 Take 2,000 Units by mouth daily. dorzolamide 2 % ophthalmic solution Commonly known as:  TRUSOPT Administer 1 Drop to both eyes two (2) times a day. glucose blood VI test strips strip Commonly known as:  ASCENSIA AUTODISC VI, ONE TOUCH ULTRA TEST VI  
True metrix strips. check BS TID  
  
 insulin glargine 300 unit/mL (1.5 mL) Inpn 34 Units by SubCUTAneous route every morning. Lancets Misc Check BS TID  
  
 latanoprost 0.005 % ophthalmic solution Commonly known as:  Jeraldene Denisha Administer 1 Drop to both eyes nightly. levothyroxine 150 mcg tablet Commonly known as:  SYNTHROID Take 1 Tab by mouth Daily (before breakfast). metFORMIN  mg tablet Commonly known as:  GLUCOPHAGE XR Take 2 Tabs by mouth daily. omeprazole 20 mg capsule Commonly known as:  PRILOSEC Take 2 Caps by mouth daily. ONE TOUCH TEST STRIP YOUSSEF  
by Does Not Apply route. We Performed the Following CBC W/O DIFF [23359 CPT(R)] HEMOGLOBIN A1C WITH EAG [11284 CPT(R)] METABOLIC PANEL, COMPREHENSIVE [69033 CPT(R)] MICROALBUMIN, UR, RAND W/ MICROALBUMIN/CREA RATIO [id number] CPT(R)] TSH 3RD GENERATION [09231 CPT(R)] VITAMIN D, 25 HYDROXY [id number] CPT(R)] Patient Instructions Schedule of Personalized Health Plan (Provide Copy to Patient) The best way to stay healthy is to live a healthy lifestyle. A healthy lifestyle includes regular exercise, eating a well-balanced diet, keeping a healthy weight and not smoking. Regular physical exams and screening tests are another important way to take care of yourself. Preventive exams provided by health care providers can find health problems early when treatment works best and can keep you from getting certain diseases or illnesses. Preventive services include exams, lab tests, screenings, shots, monitoring and information to help you take care of your own health. All people over 65 should have a pneumonia shot. Pneumonia shots are usually only needed once in a lifetime unless your doctor decides differently. Up to date All people over 65 should have a yearly flu shot. People over 65 are at medium to high risk for Hepatitis B. Three shots are needed for complete protection. In addition to your physical exam, some screening tests are recommended: 
 
Bone mass measurement (dexa scan) is recommended every two years if you have certain risk factors, such as personal history of vertebral fracture or chronic steroid medication use Diabetes Mellitus screening is recommended every year. Glaucoma is an eye disease caused by high pressure in the eye. An eye exam is recommended every year. Up to date Cardiovascular screening tests that check your cholesterol and other blood fat (lipid) levels are recommended every five years. Up to date Colorectal Cancer screening tests help to find pre-cancerous polyps (growths in the colon) so they can be removed before they turn into cancer. Tests ordered for screening depend on your personal and family history risk factors. N/A Screening for Prostate Cancer is recommended yearly with a digital rectal exam and/or a PSA test.N/A Here is a list of your current Health Maintenance items with a due date: 
Health Maintenance Topic Date Due  
 EYE EXAM RETINAL OR DILATED Q1  09[birth year]09/1944  
 DTaP/Tdap/Td series (1 - Tdap) 09/09/1955  ZOSTER VACCINE AGE 60>  09/09/1994  Pneumococcal 65+ Low/Medium Risk (1 of 2 - PCV13) 09/09/1999  
 HEMOGLOBIN A1C Q6M  04/25/2017  MICROALBUMIN Q1  07/27/2017  INFLUENZA AGE 9 TO ADULT  08/01/2017  LIPID PANEL Q1  02/18/2018  
 FOOT EXAM Q1  06/27/2018  MEDICARE YEARLY EXAM  06/28/2018  GLAUCOMA SCREENING Q2Y  06/02/2019 Introducing \[facility] [place] [facility]\"" & HEALTH SERVICES! Donte Grimaldo introduces [employer] patient portal. Now you can access parts of your medical record, email your doctor's office, and request medication refills online. 1. In your internet browser, go to [email or web address] 2. Click on the First Time User? Click Here link in the Sign In box. You will see the New Member Sign Up page. 3. Enter your [employer] Access Code exactly as it appears below. You will not need to use this code after youve completed the sign-up process. If you do not sign up before the expiration date, you must request a new code. · [employer] Access Code: [email or web address] Expires: 9/25/2017  9:32 AM 
 
4. Enter the last four digits of your Social Security Number (xxxx) and Date of Birth (mm/dd/yyyy) as indicated and click Submit. You will be taken to the next sign-up page. 5. Create a [employer] ID. This will be your [employer] login ID and cannot be changed, so think of one that is secure and easy to remember. 6. Create a [employer] password. You can change your password at any time. 7. Enter your Password Reset Question and Answer. This can be used at a later time if you forget your password. 8. Enter your e-mail address. You will receive e-mail notification when new information is available in [address] 9. Click Sign Up. You can now view and download portions of your medical record. 10. Click the Download Summary menu link to download a portable copy of your medical information. If you have questions, please visit the Frequently Asked Questions section of the [employer] website. Remember, [employer] is NOT to be used for urgent needs. For medical emergencies, dial 911. Now available from your iPhone and Android! Please provide this summary of care documentation to your next provider. Your primary care clinician is listed as Rom Denney. If you have any questions after today's visit, please call [phone].

## 2017-06-29 NOTE — PROGRESS NOTES
Uncontrolled diabetes. will increase metformin 500 mg to 1 tab AM and 2 tab PM.mel dd glucotrol XL 5 mg p oQD.repeat CMP in 1 month. also thyroid is low. will increase synthroid to 175 MCG a day. repeat TSH in 6 week.

## 2017-07-11 NOTE — TELEPHONE ENCOUNTER
PA initiated for Metformin ER 750mg at 2250mg daily through [employer]. The key is [id number].   Awaiting approval/denial

## 2017-10-24 NOTE — MR AVS SNAPSHOT
Visit Information Date & Time Provider Department Dept. Phone Encounter #  
 10/24/2017  8:45 AM Gil Javed MD [facility] Internal Medicine [facility] [phone] [id number] Upcoming Health Maintenance Date Due  
 EYE EXAM RETINAL OR DILATED Q1 9[birth year]9/1944 DTaP/Tdap/Td series (1 - Tdap) 9/9/1955 INFLUENZA AGE 9 TO ADULT 8/1/2017 HEMOGLOBIN A1C Q6M 12/27/2017 LIPID PANEL Q1 2/18/2018 FOOT EXAM Q1 6/27/2018 MICROALBUMIN Q1 6/27/2018 MEDICARE YEARLY EXAM 6/28/2018 GLAUCOMA SCREENING Q2Y 6/2/2019 Allergies as of 10/24/2017  Review Complete On: 10/24/2017 By: Gil Javed MD  
  
 Severity Noted Reaction Type Reactions Shellfish Derived High 07/26/2016    Anaphylaxis Iodine Medium 07/26/2016    Rash Vancomycin  07/26/2016    Other (comments) States had extreme temp change Current Immunizations  Reviewed on 6/27/2017 Name Date Pneumococcal Conjugate (PCV-13) 7/27/2016 Pneumococcal Polysaccharide (PPSV-23) 6/27/2015 Zoster Vaccine, Live 5/11/2017 Not reviewed this visit You Were Diagnosed With   
  
 Codes Comments Type 2 diabetes mellitus without complication, with long-term current use of insulin ([facility])    -  Primary ICD-10-CM: E11.9, Z79.4 ICD-9-CM: 250.00, V58.67 Coronary artery disease involving native coronary artery of native heart without angina pectoris     ICD-10-CM: I25.10 ICD-9-CM: 414.01 Acquired hypothyroidism     ICD-10-CM: E03.9 ICD-9-CM: 244.9 Psoriasis     ICD-10-CM: L40.9 ICD-9-CM: 696.1 Mild asthma without complication, unspecified whether persistent     ICD-10-CM: J45.909 ICD-9-CM: 493.90 Vitamin D deficiency     ICD-10-CM: E55.9 ICD-9-CM: 268.9 Vitals BP Pulse Temp Resp Height(growth percentile) Weight(growth percentile) 144/79 (BP 1 Location: Right arm, BP Patient Position: Sitting) 74 97.7 °F (36.5 °C) (Oral) 20 5' 9\" (1.753 m) 166 lb (75.3 kg) SpO2 BMI Smoking Status 93% 24.51 kg/m2 Former Smoker Vitals History BMI and BSA Data Body Mass Index Body Surface Area 24.51 kg/m 2 1.91 m 2 Preferred Pharmacy Pharmacy Name Phone [facility]. [phone] Your Updated Medication List  
  
   
This list is accurate as of: 10/24/17  9:09 AM.  Always use your most recent med list.  
  
  
  
  
 adalimumab 40 mg/0.8 mL injection pen Commonly known as:  HUMIRA  
by SubCUTAneous route. Indications: inject 1 every 15 days  
  
 albuterol 90 mcg/actuation inhaler Commonly known as:  PROVENTIL HFA, VENTOLIN HFA, PROAIR HFA Take 1 Puff by inhalation every six (6) hours as needed for Wheezing. aspirin delayed-release 81 mg tablet Take  by mouth daily. atorvastatin 20 mg tablet Commonly known as:  LIPITOR Blood-Glucose Meter monitoring kit True metrix meter Provider, please review the patient's drug allergy history. Some issues need clarification. BS TID Cholecalciferol (Vitamin D3) 2,000 unit Cap capsule Commonly known as:  VITAMIN D3 Take 2,000 Units by mouth daily. dorzolamide 2 % ophthalmic solution Commonly known as:  TRUSOPT Administer 1 Drop to both eyes two (2) times a day. glucose blood VI test strips strip Commonly known as:  ASCENSIA AUTODISC VI, ONE TOUCH ULTRA TEST VI  
True metrix strips. check BS TID  
  
 insulin glargine 300 unit/mL (1.5 mL) Inpn 34 Units by SubCUTAneous route every morning. Lancets Misc Check BS TID  
  
 latanoprost 0.005 % ophthalmic solution Commonly known as:  Brissa Blake Administer 1 Drop to both eyes nightly. levothyroxine 175 mcg tablet Commonly known as:  SYNTHROID Take 1 Tab by mouth Daily (before breakfast). metFORMIN  mg tablet Commonly known as:  GLUCOPHAGE XR  
1 tab po AM and 2 tab po PM. omeprazole 20 mg capsule Commonly known as:  PRILOSEC  
 Take 2 Caps by mouth daily. ONE TOUCH TEST STRIP YOUSSEF  
by Does Not Apply route. Prescriptions Sent to Pharmacy Refills  
 albuterol (PROVENTIL HFA, VENTOLIN HFA, PROAIR HFA) 90 mcg/actuation inhaler 1 Sig: Take 1 Puff by inhalation every six (6) hours as needed for Wheezing. Class: Normal  
 Pharmacy: [facility] Chani Fiore  #: [phone] Route: Inhalation We Performed the Following HEMOGLOBIN A1C WITH EAG [23679 CPT(R)] METABOLIC PANEL, COMPREHENSIVE [17178 CPT(R)] TSH 3RD GENERATION [24643 CPT(R)] VITAMIN D, 25 HYDROXY [id number] CPT(R)] Patient Instructions Learning About Diabetes Food Guidelines Your Care Instructions Meal planning is important to manage diabetes. It helps keep your blood sugar at a target level (which you set with your doctor). You don't have to eat special foods. You can eat what your family eats, including sweets once in a while. But you do have to pay attention to how often you eat and how much you eat of certain foods. You may want to work with a dietitian or a certified diabetes educator (CDE) to help you plan meals and snacks. A dietitian or CDE can also help you lose weight if that is one of your goals. What should you know about eating carbs? Managing the amount of carbohydrate (carbs) you eat is an important part of healthy meals when you have diabetes. Carbohydrate is found in many foods. · Learn which foods have carbs. And learn the amounts of carbs in different foods. ¨ Bread, cereal, pasta, and rice have about 15 grams of carbs in a serving. A serving is 1 slice of bread (1 ounce), ½ cup of cooked cereal, or 1/3 cup of cooked pasta or rice. ¨ Fruits have 15 grams of carbs in a serving.  A serving is 1 small fresh fruit, such as an apple or orange; ½ of a banana; ½ cup of cooked or canned fruit; ½ cup of fruit juice; 1 cup of melon or raspberries; or 2 tablespoons of dried fruit. ¨ Milk and no-sugar-added yogurt have 15 grams of carbs in a serving. A serving is 1 cup of milk or 2/3 cup of no-sugar-added yogurt. ¨ Starchy vegetables have 15 grams of carbs in a serving. A serving is ½ cup of mashed potatoes or sweet potato; 1 cup winter squash; ½ of a small baked potato; ½ cup of cooked beans; or ½ cup cooked corn or green peas. · Learn how much carbs to eat each day and at each meal. A dietitian or CDE can teach you how to keep track of the amount of carbs you eat. This is called carbohydrate counting. · If you are not sure how to count carbohydrate grams, use the Plate Method to plan meals. It is a good, quick way to make sure that you have a balanced meal. It also helps you spread carbs throughout the day. ¨ Divide your plate by types of foods. Put non-starchy vegetables on half the plate, meat or other protein food on one-quarter of the plate, and a grain or starchy vegetable in the final quarter of the plate. To this you can add a small piece of fruit and 1 cup of milk or yogurt, depending on how many carbs you are supposed to eat at a meal. 
· Try to eat about the same amount of carbs at each meal. Do not \"save up\" your daily allowance of carbs to eat at one meal. 
· Proteins have very little or no carbs per serving. Examples of proteins are beef, chicken, turkey, fish, eggs, tofu, cheese, cottage cheese, and peanut butter. A serving size of meat is 3 ounces, which is about the size of a deck of cards. Examples of meat substitute serving sizes (equal to 1 ounce of meat) are 1/4 cup of cottage cheese, 1 egg, 1 tablespoon of peanut butter, and ½ cup of tofu. How can you eat out and still eat healthy? · Learn to estimate the serving sizes of foods that have carbohydrate. If you measure food at home, it will be easier to estimate the amount in a serving of restaurant food.  
· If the meal you order has too much carbohydrate (such as potatoes, corn, or baked beans), ask to have a low-carbohydrate food instead. Ask for a salad or green vegetables. · If you use insulin, check your blood sugar before and after eating out to help you plan how much to eat in the future. · If you eat more carbohydrate at a meal than you had planned, take a walk or do other exercise. This will help lower your blood sugar. What else should you know? · Limit saturated fat, such as the fat from meat and dairy products. This is a healthy choice because people who have diabetes are at higher risk of heart disease. So choose lean cuts of meat and nonfat or low-fat dairy products. Use olive or canola oil instead of butter or shortening when cooking. · Don't skip meals. Your blood sugar may drop too low if you skip meals and take insulin or certain medicines for diabetes. · Check with your doctor before you drink alcohol. Alcohol can cause your blood sugar to drop too low. Alcohol can also cause a bad reaction if you take certain diabetes medicines. Follow-up care is a key part of your treatment and safety. Be sure to make and go to all appointments, and call your doctor if you are having problems. It's also a good idea to know your test results and keep a list of the medicines you take. Where can you learn more? Go to http://jazz-roger.info/. Enter W874 in the search box to learn more about \"Learning About Diabetes Food Guidelines. \" Current as of: March 13, 2017 Content Version: 11.3 © 2579-7423 [employer]. Care instructions adapted under license by [employer] (which disclaims liability or warranty for this information). If you have questions about a medical condition or this instruction, always ask your healthcare professional. [place] [address] any warranty or liability for your use of this information. Introducing [facility]!    
 Luisa Stacy introduces [employer] patient portal. Now you can access parts of your medical record, email your doctor's office, and request medication refills online. 1. In your internet browser, go to [email or web address] 2. Click on the First Time User? Click Here link in the Sign In box. You will see the New Member Sign Up page. 3. Enter your Fidzup Access Code exactly as it appears below. You will not need to use this code after youve completed the sign-up process. If you do not sign up before the expiration date, you must request a new code. · Fidzup Access Code: [email or web address] Expires: 1/22/2018  9:09 AM 
 
4. Enter the last four digits of your Social Security Number (xxxx) and Date of Birth (mm/dd/yyyy) as indicated and click Submit. You will be taken to the next sign-up page. 5. Create a Fidzup ID. This will be your Fidzup login ID and cannot be changed, so think of one that is secure and easy to remember. 6. Create a Fidzup password. You can change your password at any time. 7. Enter your Password Reset Question and Answer. This can be used at a later time if you forget your password. 8. Enter your e-mail address. You will receive e-mail notification when new information is available in [address] 9. Click Sign Up. You can now view and download portions of your medical record. 10. Click the Download Summary menu link to download a portable copy of your medical information. If you have questions, please visit the Frequently Asked Questions section of the F[employer]zup website. Remember, Fidzup is NOT to be used for urgent needs. For medical emergencies, dial 911. Now available from your iPhone and Android! Please provide this summary of care documentation to your next provider. Your primary care clinician is listed as Laurel Pagan. If you have any questions after today's visit, please call [phone].

## 2017-10-24 NOTE — PATIENT INSTRUCTIONS

## 2017-10-24 NOTE — PROGRESS NOTES
HISTORY OF PRESENT ILLNESS  Melina Serrano is a 80 y.o. male here to follow up. He underwent cardiac cath.hios stent are in right place. no occlusion were found. Has diabetes. watchign diet. BS log reviewed,[facility]  and lowest is 108. Not complaint with diet.eue check up is up to date. has seen podiatrist too. Has psoriasis,will start on on humara injection. stable. Has low thyroid and vit D.need adjustment. need lab work. Reports compliance with med's. active,able to take care of himself. Received flu shot.had asthma in young age. not using inhaler. Diabetes     Cholesterol Problem     Labs         Review of Systems   Constitutional: Negative. HENT: Negative. Eyes: Negative. Negative for blurred vision and double vision. Respiratory: Negative. Cardiovascular: Negative. Gastrointestinal: Negative. Genitourinary: Negative. Musculoskeletal: Negative. Skin: Negative. Neurological: Negative. Psychiatric/Behavioral: Negative. Physical Exam   Constitutional: He appears well-developed and well-nourished. No distress. Neck: Normal range of motion. Neck supple. No tracheal deviation present. No thyromegaly present. Cardiovascular: Normal rate, regular rhythm, normal heart sounds and intact distal pulses. Pulmonary/Chest: Effort normal. No respiratory distress. He has wheezes. Sporadic insp wheezing. Abdominal: Soft. Bowel sounds are normal.   Musculoskeletal: Normal range of motion. He exhibits no edema or tenderness. Psychiatric: He has a normal mood and affect. His behavior is normal.       ASSESSMENT and PLAN    Diagnoses and all orders for this visit:    1. Type 2 diabetes mellitus without complication, with long-term current use of insulin (HCC)    On insulin.  -     METABOLIC PANEL, COMPREHENSIVE  -     HEMOGLOBIN A1C WITH EAG    2. Coronary artery disease involving native coronary artery of native heart without angina pectoris    Stable. on meds.   - METABOLIC PANEL, COMPREHENSIVE    3. Acquired hypothyroidism  On synthroid. will check,  -     TSH 3RD GENERATION    4. Psoriasis  On injection. 5. Mild asthma without complication, unspecified whether persistent  Will order,  -     albuterol (PROVENTIL HFA, VENTOLIN HFA, PROAIR HFA) 90 mcg/actuation inhaler; Take 1 Puff by inhalation every six (6) hours as needed for Wheezing. 6. Vitamin D deficiency  -     VITAMIN D, 25 HYDROXY            . Return if symptoms worsen or fail to improve. Advised him to call back or return to office if symptoms worsen/change/persist.   Discussed expected course/resolution/complications of diagnosis in detail with patient. Medication risks/benefits/costs/interactions/alternatives discussed with patient. He was given an after visit summary which includes diagnoses, current medications, & vitals. He expressed understanding with the diagnosis and plan.

## 2017-10-24 NOTE — PROGRESS NOTES
Health Maintenance Due   Topic Date Due    EYE EXAM RETINAL OR DILATED Q1  09/09/1944    DTaP/Tdap/Td series (1 - Tdap) 09/09/1955    INFLUENZA AGE 9 TO ADULT  08/01/2017       Chief Complaint   Patient presents with    Hypothyroidism     4 month follow up    Diabetes    Cholesterol Problem       1. Have you been to the ER, urgent care clinic since your last visit? Hospitalized since your last visit? No    2. Have you seen or consulted any other health care providers outside of the [address] [place] [address] since your last visit? Include any pap smears or colon screening. No    3) Do you have an Advance Directive on file? no    4) Are you interested in receiving information on Advance Directives? NO      Patient is accompanied by self I have received verbal consent from Kishan Haines to discuss any/all medical information while they are present in the room.     Patient presents for lab draw ordered by Dr Jose Hopper    The following labs were drawn and sent to lab by Amena Sky LPN:    CMP, TSH, 3rd Generation, HgA1C and Vit D    The following tubes were sent:    Lavender  ( 1) and Tiger 1    Patient tolerated procedure well, blood obtained via venipuncture to left antecubital

## 2017-10-24 NOTE — LETTER
11/7/2017 1:03 PM 
 
Mr. Lien Doan [address]  [id number] [facility] [phone] Dear Jerod Kc: Please find your most recent results below. Resulted Orders METABOLIC PANEL, COMPREHENSIVE Result Value Ref Range Glucose 284 (H) 65 - 99 mg/dL BUN 10 8 - 27 mg/dL Creatinine 0.86 0.76 - 1.27 mg/dL GFR est non-AA 80 >59 mL/min/1.73 GFR est AA 93 >59 mL/min/1.73  
 BUN/Creatinine ratio 12 10 - 24 Sodium 139 134 - 144 mmol/L Potassium 5.2 3.5 - 5.2 mmol/L Chloride 97 96 - 106 mmol/L  
 CO2 28 18 - 29 mmol/L Calcium 9.2 8.6 - 10.2 mg/dL Protein, total 6.6 6.0 - 8.5 g/dL Albumin 3.6 3.5 - 4.7 g/dL GLOBULIN, TOTAL 3.0 1.5 - 4.5 g/dL A-G Ratio 1.2 1.2 - 2.2 Bilirubin, total 0.3 0.0 - 1.2 mg/dL Alk. phosphatase 140 (H) 39 - 117 IU/L  
 AST (SGOT) 11 0 - 40 IU/L  
 ALT (SGPT) 12 0 - 44 IU/L Narrative Performed at:  [address]  [id number] : Shara Feldman MD, Phone:  [phone] TSH 3RD GENERATION Result Value Ref Range TSH 0.047 (L) 0.450 - 4.500 uIU/mL Narrative Performed at:  [address]  [id number] : Shara Feldman MD, Phone:  [phone] HEMOGLOBIN A1C WITH EAG Result Value Ref Range Hemoglobin A1c 13.3 (H) 4.8 - 5.6 % Comment:  
            Pre-diabetes: 5.7 - 6.4 Diabetes: >6.4 Glycemic control for adults with diabetes: <7.0 Estimated average glucose 335 mg/dL Narrative Performed at:  [address]  [id number] : Shara Feldman MD, Phone:  [phone] VITAMIN D, 25 HYDROXY Result Value Ref Range VITAMIN D, 25-HYDROXY 50.6 30.0 - 100.0 ng/mL    Comment:  
   Vitamin D deficiency has been defined by the [facility] practice guideline as a 
 level of serum 25-OH vitamin D less than 20 ng/mL (1,2). The Endocrine Society went on to further define vitamin D 
insufficiency as a level between 21 and 29 ng/mL (2). 1. IOM ([place] of Medicine). 2010. Dietary reference 
   intakes for calcium and D. [facility]: The 
   Keep Your Pharmacy Open. 2. Rom MF, Rosangela NC, Yamilex DUMONT, et al. 
   Evaluation, treatment, and prevention of vitamin D 
   deficiency: an Endocrine Society clinical practice 
   guideline. JCEM. 2011 Jul; 96(7):1911-30. Narrative Performed at:  [address]  [id number] : Everette Rendon MD, Phone:  [phone] RECOMMENDATIONS: 
 
Uncontrolled DM.will add glucotrol XL 5 mg pO QD. Watch ADA diet and exercise. Thyroid is high.will reduce to synthroid 150 MCG a day. Repeat TSH in 6 weeks Please call me if you have any questions: [phone] Sincerely, Alfredo Orosco MD

## 2017-10-31 NOTE — PROGRESS NOTES
uncontrolled DM.will add glucotrol XL 5 mg pO QD.watch ADA diet and exercise. Thyroid is high.will reduce to synthroid 150 MCG a day. repeat TSH in 6 weeks.

## 2018-01-01 ENCOUNTER — HOSPITAL ENCOUNTER (EMERGENCY)
Age: 83
End: 2018-02-06
Attending: EMERGENCY MEDICINE
Payer: MEDICARE

## 2018-01-01 VITALS — OXYGEN SATURATION: 44 %

## 2018-01-01 DIAGNOSIS — I46.9 CARDIAC ARREST (HCC): Primary | ICD-10-CM

## 2018-01-01 PROCEDURE — 99285 EMERGENCY DEPT VISIT HI MDM: CPT

## 2018-01-01 PROCEDURE — 74011250636 HC RX REV CODE- 250/636: Performed by: EMERGENCY MEDICINE

## 2018-01-01 PROCEDURE — 99284 EMERGENCY DEPT VISIT MOD MDM: CPT

## 2018-01-01 PROCEDURE — 92950 HEART/LUNG RESUSCITATION CPR: CPT

## 2018-01-01 RX ORDER — EPINEPHRINE 0.1 MG/ML
INJECTION INTRACARDIAC; INTRAVENOUS
Status: COMPLETED | OUTPATIENT
Start: 2018-01-01 | End: 2018-01-01

## 2018-01-01 RX ADMIN — EPINEPHRINE 1 MG: 0.1 INJECTION INTRACARDIAC; INTRAVENOUS at 18:19

## 2018-01-01 RX ADMIN — EPINEPHRINE 1 MG: 0.1 INJECTION INTRACARDIAC; INTRAVENOUS at 18:16

## 2018-02-06 NOTE — ED TRIAGE NOTES
Pt transported to emergency room in cardiac arrest wife witnessed patient go down at 1740 ems reported asystole in the field, they attempted to place a melissa tube however was unsuccessful and placed an OPA. And ventilated via bag valve mask. Pt received 4 rounds of epinephrine in the field. Arrived with autopulse running.

## 2018-02-06 NOTE — ED NOTES
Patient placed in post mortem bag, all acces to patient left as it was at code end. Wife remains at [employer].

## 2018-02-06 NOTE — ED PROVIDER NOTES
HPI Comments: 80 y.o. male with past medical history significant for thyroid disease, DM, high cholesterol, CAD, cardiac stent placement, and angioplasty who presents from [facility] via EMS with chief complaint of cardiac arrest. According to EMS, the pt had not been feelnig well all day. The pt got up to go to the bathroom and collapsed as witnessed by the wife. For EMS, they have been working the code for 30 minutes PTA(started at [id number]). The pt was consistenly in asystole. Initial ETCO2 was 14 and went down to 5. EMS gvae 4 rounds of epinephrine PTA with out change in cardiac rhythm. They were unable to intubate the pt. OPA in place. The pt was living independent living. There are no other acute medical concerns at this time. Social hx: former smoker, no EtOH use  PCP: Gabi Masters MD    Full history, physical exam, and ROS unable to be obtained due to:  Cardiac arrest.    Note written by Brian Simpson, as dictated by Felisha Marrufo MD 6:27 PM        The history is provided by the patient. No  was used. 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      Social History Main Topics    Smoking status: Former Smoker     Quit date: 7/26/1976    Smokeless tobacco: Never Used    Alcohol use No    Drug use: No    Sexual activity: No      Comment: retired. relocated from [place] to [employer],     Other Topics Concern    Not on file     Social History Narrative         ALLERGIES: Shellfish derived; Iodine; and Vancomycin    Review of Systems   Unable to perform ROS: Other       There were no vitals filed for this visit. Physical Exam   Constitutional: He appears well-developed. Being bagged by EMS at 100% RA; Active CPR;     HENT:   Head: Normocephalic. Cardiovascular:   Full cardiopulmonary arrest;  Asystolic; Abdominal: Soft. Skin:   livido reticularis;  Cyanotic;     Nursing note and vitals reviewed. Note written by Brian Jacobs, as dictated by Delmar Shahid MD 6:34 PM      MDM  Number of Diagnoses or Management Options  Critical Care  Total time providing critical care: 30-74 minutes (Total critical care time spent exclusive of procedures:  31 minutes  )        ED Course       Procedures    6:34 PM  3 more rounds of epinephrine were administered in the ED without response.  Code was called at [phone].    9:00 PM  Spoke with the family

## 2018-02-07 NOTE — PROGRESS NOTES
Responded to notification of pt death in ER 15; no family was present; provided prayer of commendation. Family arrived around 8:30 pm; facilitated communication between family and medical team; provided pastoral care to family as they visited with the ; family did not have any  arrangements and to call in with the info to nursing supervisor. Rev. Ashlyn Lee, Ph.D., M.Div., M.A.,   /Director of [address] [facility]  Paging Service: [phone])

## 2018-02-07 NOTE — ED NOTES
[facility] care contacted and is seeing patient, attempting to contact wife at [address] [place] [address] Dr Arteaga to have her speak with provider.